# Patient Record
Sex: FEMALE | Race: WHITE | NOT HISPANIC OR LATINO | Employment: OTHER | URBAN - METROPOLITAN AREA
[De-identification: names, ages, dates, MRNs, and addresses within clinical notes are randomized per-mention and may not be internally consistent; named-entity substitution may affect disease eponyms.]

---

## 2022-05-09 RX ORDER — AMLODIPINE BESYLATE 10 MG/1
10 TABLET ORAL DAILY
COMMUNITY

## 2022-05-09 RX ORDER — VIT C/E/ZN/COPPR/LUTEIN/ZEAXAN 250MG-90MG
4000 CAPSULE ORAL DAILY
COMMUNITY

## 2022-05-09 RX ORDER — CALCITRIOL 0.25 UG/1
0.25 CAPSULE ORAL
COMMUNITY
Start: 2021-08-31 | End: 2023-09-15 | Stop reason: SDUPTHER

## 2022-05-10 RX ORDER — EZETIMIBE 10 MG/1
TABLET ORAL
COMMUNITY
End: 2022-12-02

## 2022-05-10 RX ORDER — OMEPRAZOLE 40 MG/1
40 CAPSULE, DELAYED RELEASE ORAL DAILY
COMMUNITY

## 2022-05-10 RX ORDER — GLIPIZIDE 10 MG/1
10 TABLET ORAL 2 TIMES DAILY
COMMUNITY

## 2022-05-10 RX ORDER — INSULIN DEGLUDEC 200 U/ML
14 INJECTION, SOLUTION SUBCUTANEOUS DAILY
COMMUNITY

## 2022-05-12 ENCOUNTER — OFFICE VISIT (OUTPATIENT)
Dept: NEPHROLOGY | Facility: CLINIC | Age: 77
End: 2022-05-12
Payer: MEDICARE

## 2022-05-12 VITALS
BODY MASS INDEX: 26.52 KG/M2 | OXYGEN SATURATION: 99 % | TEMPERATURE: 98 F | WEIGHT: 165 LBS | HEART RATE: 78 BPM | SYSTOLIC BLOOD PRESSURE: 139 MMHG | DIASTOLIC BLOOD PRESSURE: 82 MMHG | HEIGHT: 66 IN

## 2022-05-12 DIAGNOSIS — I10 ESSENTIAL HYPERTENSION: Primary | ICD-10-CM

## 2022-05-12 DIAGNOSIS — N18.4 CHRONIC KIDNEY DISEASE, STAGE IV (SEVERE): ICD-10-CM

## 2022-05-12 DIAGNOSIS — Z79.4 DIABETES MELLITUS, TYPE II, INSULIN DEPENDENT: ICD-10-CM

## 2022-05-12 DIAGNOSIS — N25.81 SECONDARY HYPERPARATHYROIDISM OF RENAL ORIGIN: ICD-10-CM

## 2022-05-12 DIAGNOSIS — E78.2 HYPERLIPIDEMIA, MIXED: ICD-10-CM

## 2022-05-12 DIAGNOSIS — E11.9 DIABETES MELLITUS, TYPE II, INSULIN DEPENDENT: ICD-10-CM

## 2022-05-12 DIAGNOSIS — R80.1 PERSISTENT PROTEINURIA: ICD-10-CM

## 2022-05-12 PROCEDURE — 99213 PR OFFICE/OUTPT VISIT, EST, LEVL III, 20-29 MIN: ICD-10-PCS | Mod: ,,, | Performed by: NURSE PRACTITIONER

## 2022-05-12 PROCEDURE — 99213 OFFICE O/P EST LOW 20 MIN: CPT | Mod: ,,, | Performed by: NURSE PRACTITIONER

## 2022-05-12 RX ORDER — ASPIRIN 81 MG/1
81 TABLET ORAL DAILY
COMMUNITY
End: 2023-09-15

## 2022-05-12 RX ORDER — ATORVASTATIN CALCIUM 80 MG/1
80 TABLET, FILM COATED ORAL DAILY
COMMUNITY

## 2022-05-12 NOTE — PROGRESS NOTES
Pt Name:  Anna Berry  Pt :  1945  Pt MRN:  [id number]    Date: 2022    Reason for visit:   Anna Berry is a 77 y.o. c female presenting for CKD follow up with serum creatinine 1.69, eGFR 29 and Chronic Kidney Disease Stage 4.     Chief Complaint:   The patient denies any complaints today.    HPI:  The patient is being seen today for follow up CKD and the status of her kidney function. Since the last visit, patient reports no health changes.  The patient denies fatigue, weakness, poor appetite, metallic taste, nausea, cramping, chest pain, palpitations, SOB, orthopnea, PND, edema, trouble concentrating, decreased urination.      Home BPs when checked are <130/80 per patient. The patient is following a low sodium diet. The patient is avoiding NSAIDs. The patient is drinking 64 + oz of water daily.     Since last visit on 22 patient reports no changes in medical history.      History:   Past Medical History:   Diagnosis Date    CKD (chronic kidney disease) stage 4, GFR 15-29 ml/min     DM type 2 (diabetes mellitus, type 2)     GERD (gastroesophageal reflux disease)     HTN (hypertension)     Mixed hyperlipidemia     Pancreatitis     Unspecified osteoarthritis, unspecified site      Past Surgical History:   Procedure Laterality Date    CHOLECYSTECTOMY      COLONOSCOPY      FRACTURE SURGERY      pins placed for facial fracture    HYSTERECTOMY      THROAT SURGERY       Family History   Problem Relation Age of Onset    Diabetes Mother     Diabetes Father     Asthma Sister     Hyperlipidemia Sister     Cancer Sister     Hypertension Sister     Diabetes Sister     Hypertension Brother     Hyperlipidemia Brother     Diabetes Brother     Cancer Brother     Heart disease Brother      Social History     Substance and Sexual Activity   Alcohol Use Never     Social History     Substance and Sexual Activity   Drug Use Never     Social History     Substance and Sexual Activity  "  Sexual Activity Not Currently     reports previously being sexually active.  Social History     Tobacco Use   Smoking Status Never Smoker   Smokeless Tobacco Never Used       Allergies:  Review of patient's allergies indicates:   Allergen Reactions    Penicillin g Anaphylaxis and Hives    Sulfa (sulfonamide antibiotics) Other (See Comments)     PATIENT STATE SHE FEELS "OUT OF HER HEAD"         Current Outpatient Medications:     amLODIPine (NORVASC) 10 MG tablet, Take 10 mg by mouth once daily at 6am., Disp: , Rfl:     aspirin (ECOTRIN) 81 MG EC tablet, Take 81 mg by mouth once daily., Disp: , Rfl:     atorvastatin (LIPITOR) 80 MG tablet, Take 80 mg by mouth once daily., Disp: , Rfl:     calcitRIOL (ROCALTROL) 0.25 MCG Cap, Take 0.25 mcg by mouth 3 (three) times daily., Disp: , Rfl:     cholecalciferol, vitamin D3, (VITAMIN D3) 50 mcg (2,000 unit) Cap capsule, Take 2,000 Units by mouth once daily at 6am., Disp: , Rfl:     ezetimibe (ZETIA) 10 mg tablet, ezetimibe 10 mg tablet  TAKE 1 TABLET BY MOUTH EVERY DAY, Disp: , Rfl:     glipiZIDE (GLUCOTROL) 10 MG tablet, Take 10 mg by mouth 2 (two) times a day., Disp: , Rfl:     insulin degludec (TRESIBA FLEXTOUCH U-200) 200 unit/mL (3 mL) insulin pen, Inject 12 Units into the skin once daily at 6am., Disp: , Rfl:     loratadine-pseudoephedrine 5-120 mg (CLARITIN-D 12-HOUR) 5-120 mg per tablet, Take 5 tablets by mouth once daily at 6am., Disp: , Rfl:     omeprazole (PRILOSEC) 40 MG capsule, Take 40 mg by mouth once daily at 6am., Disp: , Rfl:     ROS:  Review of Systems   Constitutional: Negative for activity change and appetite change.   HENT: Negative for hearing loss.    Eyes: Negative for visual disturbance.   Respiratory: Negative for shortness of breath and wheezing.    Cardiovascular: Negative for chest pain.   Gastrointestinal: Negative for abdominal pain, diarrhea, nausea and vomiting.   Genitourinary: Negative for decreased urine volume, dysuria, " "flank pain, frequency and urgency.   Neurological: Negative for dizziness, weakness and headaches.       Physical Exam:   Vitals:   Vitals:    05/12/22 1349   BP: 139/82   Pulse: 78   Temp: 98.4 °F (36.9 °C)   SpO2: 99%   Weight: 74.8 kg (165 lb)   Height: 5' 5.5" (1.664 m)   PainSc: 0-No pain     Body mass index is 27.04 kg/m².    Physical Exam  Vitals reviewed.   Constitutional:       General: She is not in acute distress.     Appearance: Normal appearance.   HENT:      Head: Normocephalic and atraumatic.      Mouth/Throat:      Mouth: Mucous membranes are moist.   Eyes:      Extraocular Movements: Extraocular movements intact.      Pupils: Pupils are equal, round, and reactive to light.   Cardiovascular:      Rate and Rhythm: Normal rate and regular rhythm.      Heart sounds: No murmur heard.  Pulmonary:      Effort: Pulmonary effort is normal.      Breath sounds: No wheezing, rhonchi or rales.   Musculoskeletal:         General: No swelling.   Skin:     General: Skin is warm and dry.   Neurological:      Mental Status: She is alert and oriented to person, place, and time.   Psychiatric:         Mood and Affect: Mood normal.         Behavior: Behavior normal.         Labs/Tests:  Component Ref Range & Units 3 d ago   (5/9/22) 4 mo ago   (1/12/22) 8 mo ago   (8/23/21) 1 yr ago   (4/20/21) 1 yr ago   (12/18/20) 1 yr ago   (8/14/20) 2 yr ago   (2/13/20)   Protein, Urine mg/dL mg/dL 34  57  44  35  23  30  22    Creatinine, Urine mg/dL mg/dL 67.6  112.5  119.2  97.0  111.4  94.3  135.6    Urine Protein/Creatinine Ratio <0.2 mg of protein/mg of creatinine 0.5 High   0.5 High   0.4 High           Component Ref Range & Units 3 d ago   (5/9/22) 3 mo ago   (1/21/22) 4 mo ago   (1/12/22) 8 mo ago   (8/23/21) 8 mo ago   (8/23/21) 1 yr ago   (4/20/21) 1 yr ago   (4/20/21)   Sodium 136 - 147 mmol/L 138  137  137  137    137    Potassium 3.5 - 5.1 mmol/L 4.8  4.5  5.6 High   4.6    5.0    Chloride 98 - 107 mmol/L 104  104  103 "  106    108 High     CO2 20 - 31 mmol/L 25  25  26  24    24    Glucose 70 - 99 mg/dL 146 High   148 High   149 High   132 High   Negative R  Negative R  182 High     BUN 9 - 23 mg/dL 25 High   27 High   19  26 High     29 High     Creatinine 0.55 - 1.02 mg/dL 1.69 High   1.68 High   1.59 High   1.71 High     1.89 High     Calcium 8.3 - 10.6 mg/dL 9.7  9.5  9.8  9.4    9.8    Phosphorus Level 2.4 - 5.1 mg/dL 3.6  3.6  3.3  3.6    4.0    Albumin Level 3.2 - 4.8 g/dL 4.3  4.5  4.5  4.6    4.4    eGFR >90 mL/min/1.73m2 29 Low   29 Low   31 Low   28 Low     25 Low     eGFR African American >90 mL/min/1.73m2 33 Low   33 Low  CM  36 Low           Component Ref Range & Units 3 d ago   (5/9/22) 4 mo ago   (1/12/22) 8 mo ago   (8/23/21) 1 yr ago   (4/20/21) 1 yr ago   (8/10/20) 2 yr ago   (2/13/20)   PTH, Intact 15 - 65 pg/mL 180 High   239 High   234 High   296              Diagnosis:  1. Essential hypertension  Renal Function Panel    Renal Function Panel   2. Diabetes mellitus, type II, insulin dependent  Renal Function Panel    Renal Function Panel   3. Secondary hyperparathyroidism of renal origin  PTH, Intact    PTH, Intact   4. Hyperlipidemia, mixed     5. Persistent proteinuria  Protein/Creatinine Ratio, Urine    Protein/Creatinine Ratio, Urine   6. Chronic kidney disease, stage IV (severe)  PTH, Intact    Renal Function Panel    Hemoglobin    PTH, Intact    Renal Function Panel    Hemoglobin       Plan:  1. Essential hypertension - near goal-  Monitor BP, 2 Gram NA diet; Continue Amlodipine 10 mg po daily    2. Diabetes mellitus, type II, insulin dependent -  unknown HgbA1c - Continue Glipizide 10 mg po daily, Tresiba 12 units daily; managed by PCP    3. Secondary hyperparathyroidism of renal origin -  - Continue Rocaltrol 0.25 mcg po M-W-F; Continue Vitamin D 3 2000 units daily    4. Hyperlipidemia, mixed - Continue Atorvastatin 80 mg and Zetia 10 mg    5. Persistent proteinuria - 500 mg -    6. Chronic  kidney disease, stage IV (severe); serum creatinine 1.69 / eGFR 29; (last visit ) Avoid NSAIDS; Assure 64 oz of water daily; meds per # 1, 2, 4          My reconciliation of medication should not condone or support use of medications that have been previously prescribed for patients by other providers.  I am only documenting the current medications patients is taking and may change doses, add or discontinue medications based on information provided by the patient.     The patient has been provided with their current level of kidney function including eGFR and creatinine.    We discussed the potential for common complications of CKD including anemia, electrolyte abnormalities, abnormal fluid balance, mineral bone disease and malnutrition.    We discussed strategies to slow the progression of their kidney disease includin. Avoid nephrotoxic agents. Avoid over-the-counter and prescription NSAIDs (Ibuprofren, Motrin, Naproxyn, Aleve, Mobic, Celebrex, Toradol, Advil). All of these can worsen kidney function, elevate BP, cause fluid retention/swelling and elevate potassium. Avoid iodine contrast agents and gadolinium, which can worsen kidney function and/or cause kidney failure. Avoid phosphosoda bowel preps which can worsen kidney function.  2. Work to improve modifiable risk factors. Aim for good control of blood glucose without episodes of hypoglycemia. Notify the provider managing your diabetes if your blood glucose < 60. Aim for good blood pressure control without episodes of hypotension. Call the office if your systolic blood pressure is consistently < 110. Aim for good control of your cholesterol.  ? AIC goal <7.0  ? BP goal <130/80        Keeping these in goal range will help prevent progression of cardiovascular disease            and chronic kidney disease.    We discussed dietary modifications:  ? Low sodium diet: 2 gm/d or less  ? Limit/avoid high potassium foods  ? Avoid potassium containing salt  substitutes  ? Limit/avoid high phosphorus foods  ? Limit daily protein intake to 0.8-1 gm/kg of your ideal body weight.    We discussed lifestyle modifications:  ? Make sure you are drinking plenty of fluids--64 ounces (1/2 gallon) daily  ? Exercise at least 30 minutes 5 x per week (total 150 minutes per week), example brisk walking  ? Achieve and maintain a healthy weight (BMI 20-25)  ? Limit alcohol consumption to <2 drinks per day  ? Stop smoking  ? Make sure you stay current on important vaccines-- pneumonia vaccines (Pneumovax and Prevnar), flu vaccine, Hepatitis B (especially patients nearing renal replacement therapy and planning hemodialysis) and Covid-19 vaccine.     Recommendations:  1. Monitor your BP at home daily and record.  2. Bring readings to your next appt.  3. Call the office if your BP is persistently >130/80.  4. Seek urgent medical attention with signs and symptoms of uremia - extreme weakness, fatigue, confusion, anorexia, metallic taste in mouth, hiccoughs, cramping, itching, chest pain, swelling, or trouble sleeping.    Follow Up:   Follow up in about 3 months (around 8/12/2022).

## 2022-08-12 ENCOUNTER — OFFICE VISIT (OUTPATIENT)
Dept: NEPHROLOGY | Facility: CLINIC | Age: 77
End: 2022-08-12
Payer: MEDICARE

## 2022-08-12 VITALS
DIASTOLIC BLOOD PRESSURE: 72 MMHG | HEIGHT: 66 IN | WEIGHT: 166.19 LBS | OXYGEN SATURATION: 99 % | TEMPERATURE: 97 F | SYSTOLIC BLOOD PRESSURE: 136 MMHG | HEART RATE: 86 BPM | BODY MASS INDEX: 26.71 KG/M2

## 2022-08-12 DIAGNOSIS — E11.9 DIABETES MELLITUS, TYPE II, INSULIN DEPENDENT: ICD-10-CM

## 2022-08-12 DIAGNOSIS — R80.9 NON-NEPHROTIC RANGE PROTEINURIA: ICD-10-CM

## 2022-08-12 DIAGNOSIS — E78.2 HYPERLIPIDEMIA, MIXED: ICD-10-CM

## 2022-08-12 DIAGNOSIS — Z79.4 DIABETES MELLITUS, TYPE II, INSULIN DEPENDENT: ICD-10-CM

## 2022-08-12 DIAGNOSIS — N25.81 SECONDARY HYPERPARATHYROIDISM OF RENAL ORIGIN: ICD-10-CM

## 2022-08-12 DIAGNOSIS — N18.4 CHRONIC KIDNEY DISEASE, STAGE IV (SEVERE): ICD-10-CM

## 2022-08-12 DIAGNOSIS — I10 ESSENTIAL HYPERTENSION: Primary | ICD-10-CM

## 2022-08-12 PROBLEM — R80.1 PERSISTENT PROTEINURIA: Status: RESOLVED | Noted: 2022-05-12 | Resolved: 2022-08-12

## 2022-08-12 PROCEDURE — 99213 PR OFFICE/OUTPT VISIT, EST, LEVL III, 20-29 MIN: ICD-10-PCS | Mod: ,,, | Performed by: NURSE PRACTITIONER

## 2022-08-12 PROCEDURE — 99213 OFFICE O/P EST LOW 20 MIN: CPT | Mod: ,,, | Performed by: NURSE PRACTITIONER

## 2022-08-12 NOTE — PROGRESS NOTES
Pt Name:  Anna Berry  Pt :  1945  Pt MRN:  [id number]    Date: 2022    Reason for visit:   Anna Berry is a 77 y.o. c female presenting for CKD follow up with serum creatinine 1.95, eGFR 24 and Chronic Kidney Disease Stage IV.     Chief Complaint:   The patient denies any complaints today.    HPI:  The patient is being seen today for follow up CKD and the status of her kidney function. Since the last visit, patient reports no health changes.  The patient denies fatigue, weakness, poor appetite, metallic taste, nausea, cramping, chest pain, palpitations, SOB, orthopnea, PND, edema, trouble concentrating, decreased urination.      Home BPs when checked are <130/80 per patient. The patient is following a low sodium diet. The patient is avoiding NSAIDs. The patient is drinking 32 - 64 oz of water daily.     Since last visit on 22 patient reports no changes in medical history.      History:   Past Medical History:   Diagnosis Date    CKD (chronic kidney disease) stage 4, GFR 15-29 ml/min     DM type 2 (diabetes mellitus, type 2)     GERD (gastroesophageal reflux disease)     HTN (hypertension)     Mixed hyperlipidemia     Pancreatitis     Unspecified osteoarthritis, unspecified site      Past Surgical History:   Procedure Laterality Date    CHOLECYSTECTOMY      COLONOSCOPY      FRACTURE SURGERY      pins placed for facial fracture    HYSTERECTOMY      THROAT SURGERY       Family History   Problem Relation Age of Onset    Diabetes Mother     Diabetes Father     Asthma Sister     Hyperlipidemia Sister     Cancer Sister     Hypertension Sister     Diabetes Sister     Hypertension Brother     Hyperlipidemia Brother     Diabetes Brother     Cancer Brother     Heart disease Brother      Social History     Substance and Sexual Activity   Alcohol Use Never     Social History     Substance and Sexual Activity   Drug Use Never     Social History     Substance and Sexual  "Activity   Sexual Activity Not Currently     reports previously being sexually active.  Social History     Tobacco Use   Smoking Status Never Smoker   Smokeless Tobacco Never Used       Allergies:  Review of patient's allergies indicates:   Allergen Reactions    Penicillin g Anaphylaxis and Hives    Sulfa (sulfonamide antibiotics) Other (See Comments)     PATIENT STATE SHE FEELS "OUT OF HER HEAD"         Current Outpatient Medications:     amLODIPine (NORVASC) 10 MG tablet, Take 10 mg by mouth once daily at 6am., Disp: , Rfl:     aspirin (ECOTRIN) 81 MG EC tablet, Take 81 mg by mouth once daily., Disp: , Rfl:     atorvastatin (LIPITOR) 80 MG tablet, Take 80 mg by mouth once daily., Disp: , Rfl:     calcitRIOL (ROCALTROL) 0.25 MCG Cap, Take 0.25 mcg by mouth once daily., Disp: , Rfl:     cholecalciferol, vitamin D3, (VITAMIN D3) 50 mcg (2,000 unit) Cap capsule, Take 2,000 Units by mouth once daily at 6am., Disp: , Rfl:     ezetimibe (ZETIA) 10 mg tablet, ezetimibe 10 mg tablet  TAKE 1 TABLET BY MOUTH EVERY DAY, Disp: , Rfl:     glipiZIDE (GLUCOTROL) 10 MG tablet, Take 10 mg by mouth 2 (two) times a day., Disp: , Rfl:     insulin degludec (TRESIBA FLEXTOUCH U-200) 200 unit/mL (3 mL) insulin pen, Inject 12 Units into the skin once daily at 6am., Disp: , Rfl:     omeprazole (PRILOSEC) 40 MG capsule, Take 40 mg by mouth once daily at 6am., Disp: , Rfl:     ROS:  Review of Systems   Constitutional: Negative for activity change and appetite change.   HENT: Negative for hearing loss.    Eyes: Negative for visual disturbance.   Respiratory: Negative for shortness of breath and wheezing.    Cardiovascular: Negative for chest pain.   Gastrointestinal: Negative for abdominal pain, diarrhea, nausea and vomiting.   Genitourinary: Negative for decreased urine volume, dysuria, flank pain, frequency and urgency.   Neurological: Negative for dizziness, weakness and headaches.       Physical Exam:   Vitals:   Vitals:    " "08/12/22 1307   BP: 136/72   Pulse: 86   Temp: 97.4 °F (36.3 °C)   SpO2: 99%   Weight: 75.4 kg (166 lb 3.2 oz)   Height: 5' 6" (1.676 m)   PainSc: 0-No pain     Body mass index is 26.83 kg/m².    Physical Exam  Vitals reviewed.   Constitutional:       General: She is not in acute distress.     Appearance: Normal appearance.   HENT:      Head: Normocephalic and atraumatic.      Mouth/Throat:      Mouth: Mucous membranes are moist.   Eyes:      Extraocular Movements: Extraocular movements intact.      Pupils: Pupils are equal, round, and reactive to light.   Cardiovascular:      Rate and Rhythm: Normal rate and regular rhythm.      Heart sounds: No murmur heard.     Comments: Trace pedal edema bilaterally   Pulmonary:      Effort: Pulmonary effort is normal.      Breath sounds: No wheezing, rhonchi or rales.   Musculoskeletal:         General: No swelling.   Skin:     General: Skin is warm and dry.   Neurological:      Mental Status: She is alert and oriented to person, place, and time.   Psychiatric:         Mood and Affect: Mood normal.         Behavior: Behavior normal.           Labs/Tests:  Lab Results   Component Value Date     (L) 08/08/2022    K 4.7 08/08/2022     08/08/2022    CO2 24 08/08/2022    BUN 32 (H) 08/08/2022    CREATININE 1.95 (H) 08/08/2022    CREATININE 1.69 (H) 05/09/2022    CREATININE 1.68 (H) 01/21/2022    GLUCOSE 318 (H) 08/08/2022    CALCIUM 9.7 08/08/2022    PHOSPHORUS 3.8 08/08/2022    ALBUMIN 4.4 08/08/2022    EGFRNONAA 24 (L) 08/08/2022    EGFRNONAA 29 (L) 05/09/2022    EGFRNONAA 29 (L) 01/21/2022    ESTGFRAFRICA 28 (L) 08/08/2022    ESTGFRAFRICA 33 (L) 05/09/2022    ESTGFRAFRICA 33 (L) 01/21/2022     (H) 08/08/2022    HGB 12.1 08/08/2022    QHCGGGIX23KE 12.6 (L) 04/20/2021     Component Ref Range & Units 4 d ago   (8/8/22) 3 mo ago   (5/9/22) 7 mo ago   (1/12/22) 11 mo ago   (8/23/21) 1 yr ago   (4/20/21) 1 yr ago   (12/18/20) 1 yr ago   (8/14/20)   Protein, Urine " mg/dL mg/dL 42  34  57  44  35  23  30    Creatinine, Urine mg/dL mg/dL 116.6  67.6  112.5  119.2  97.0  111.4  94.3    Urine Protein/Creatinine Ratio <0.2 mg of protein/mg of creatinine 0.4 High   0.5 High   0.5 High   0.4 High                Diagnosis:  1. Essential hypertension  Renal Function Panel    Renal Function Panel   2. Diabetes mellitus, type II, insulin dependent  Renal Function Panel    Renal Function Panel   3. Chronic kidney disease, stage IV (severe)  Hemoglobin    PTH, Intact    Renal Function Panel    Hemoglobin    PTH, Intact    Renal Function Panel   4. Secondary hyperparathyroidism of renal origin  PTH, Intact    PTH, Intact   5. Non-nephrotic range proteinuria  Protein/Creatinine Ratio, Urine    Protein/Creatinine Ratio, Urine   6. Hyperlipidemia, mixed  Renal Function Panel    Renal Function Panel         Plan:  Essential hypertension  At Goal  - Monitor BP, 2 Gram NA diet, Continue Norvasc 10 mg po daily     Diabetes mellitus, type II, insulin dependent  Control status unknown - Monitor BS, Continue Glipizide 10 mg po BID and Tresiba     Chronic kidney disease, stage IV (severe)  Serum creatinine 1.95 / eGFR 24; Avoid NSAIDS, Assure 64 oz of water daily, Meds per med list above    Secondary hyperparathyroidism of renal origin   - Continue Rocaltrol 25 mcg po daily    Non-nephrotic range proteinuria  Protein 400 mg - not on ACE/ARB - wants to wait on adding a medication since protein is stable     Hyperlipidemia, mixed  Continue Lipitor 80 mg po Q HS and Zetia 10 mg po Q HS         My reconciliation of medication should not condone or support use of medications that have been previously prescribed for patients by other providers.  I am only documenting the current medications patients is taking and may change doses, add or discontinue medications based on information provided by the patient.     The patient has been provided with their current level of kidney function including eGFR  and creatinine.    We discussed the potential for common complications of CKD including anemia, electrolyte abnormalities, abnormal fluid balance, mineral bone disease and malnutrition.    We discussed strategies to slow the progression of their kidney disease includin. Avoid nephrotoxic agents. Avoid over-the-counter and prescription NSAIDs (Ibuprofren, Motrin, Naproxyn, Aleve, Mobic, Celebrex, Toradol, Advil). All of these can worsen kidney function, elevate BP, cause fluid retention/swelling and elevate potassium. Avoid iodine contrast agents and gadolinium, which can worsen kidney function and/or cause kidney failure. Avoid phosphosoda bowel preps which can worsen kidney function.  2. Work to improve modifiable risk factors. Aim for good control of blood glucose without episodes of hypoglycemia. Notify the provider managing your diabetes if your blood glucose < 60. Aim for good blood pressure control without episodes of hypotension. Call the office if your systolic blood pressure is consistently < 110. Aim for good control of your cholesterol.  ? AIC goal <7.0  ? BP goal <130/80        Keeping these in goal range will help prevent progression of cardiovascular disease            and chronic kidney disease.    We discussed dietary modifications:  ? Low sodium diet: 2 gm/d or less  ? Limit/avoid high potassium foods  ? Avoid potassium containing salt substitutes  ? Limit/avoid high phosphorus foods  ? Limit daily protein intake to 0.8-1 gm/kg of your ideal body weight.    We discussed lifestyle modifications:  ? Make sure you are drinking plenty of fluids--64 ounces (1/2 gallon) daily  ? Exercise at least 30 minutes 5 x per week (total 150 minutes per week), example brisk walking  ? Achieve and maintain a healthy weight (BMI 20-25)  ? Limit alcohol consumption to <2 drinks per day  ? Stop smoking  ? Make sure you stay current on important vaccines-- pneumonia vaccines (Pneumovax and Prevnar), flu vaccine,  Hepatitis B (especially patients nearing renal replacement therapy and planning hemodialysis) and Covid-19 vaccine.     Recommendations:  1. Monitor your BP at home daily and record.  2. Bring readings to your next appt.  3. Call the office if your BP is persistently >130/80.  4. Seek urgent medical attention with signs and symptoms of uremia - extreme weakness, fatigue, confusion, anorexia, metallic taste in mouth, hiccoughs, cramping, itching, chest pain, swelling, or trouble sleeping.    Follow Up:   Follow up in about 3 months (around 11/12/2022).

## 2022-08-12 NOTE — ASSESSMENT & PLAN NOTE
Serum creatinine 1.95 / eGFR 24; Avoid NSAIDS, Assure 64 oz of water daily, Meds per med list above

## 2022-11-20 ENCOUNTER — HOSPITAL ENCOUNTER (OUTPATIENT)
Dept: TELEMEDICINE | Facility: HOSPITAL | Age: 77
Discharge: HOME | End: 2022-11-20
Payer: MEDICARE

## 2022-11-20 PROCEDURE — 99499 UNLISTED E&M SERVICE: CPT | Mod: ,,, | Performed by: PSYCHIATRY & NEUROLOGY

## 2022-11-20 PROCEDURE — 99499 NO LOS: ICD-10-PCS | Mod: ,,, | Performed by: PSYCHIATRY & NEUROLOGY

## 2022-11-20 NOTE — CONSULTS
[facility]  Vascular Neurology  Comprehensive Stroke Center  TeleVascular Neurology Acute Consultation Note      Consults    Consulting Provider: SHANNON HALL  Current Providers  No providers found    Patient Location: [facility] - TELE[facility] Emergency Department  [facility] hospital nurse at bedside with patient assisting consultant.     Patient information was obtained from patient.         Assessment/Plan:     Located at [facility]. She is presenting with generalized weakness and dizziness, LKW 1900. NIHSS-0. 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 Thrombolytic therapy not recommended due to outside of treatment window and mild non-disabling symptoms.      CT head per ER physician is no acute intra-cranial process. She does not appear large vessel occlusion but I recommend obtaining CTA head and neck with and without contrast or as CKD, recommend MRA head and neck without contrast.    Oral aspirin 81 mg now.  Head of bed flat, IV Fluids, permissive hypertension  CTA head and neck with and without contrast or as CKD, recommend MRA head and neck without contrast.  Neuro consult if in house available or transfer for neuro consult  Stroke/TIA work-up with MRI brain, Echo, PT/OT/ Speech and swallow evaluation.  They will call me with if MRA results are abnormal.        Diagnoses:   Stroke like symptoms    STROKE DOCUMENTATION     Acute Stroke Times:   Acute Stroke Times   Last Known Normal Date: 11/19/22  Last Known Normal Time: 1900  Stroke Team Called Date: 11/20/22  Stroke Team Called Time: 0243  Stroke Team Arrival Date: 11/20/22  Stroke Team Arrival Time: 0300  Thrombolytic Therapy Recommended: No    NIH Scale:  1a. Level of Consciousness: 0-->Alert, keenly responsive  1b. LOC Questions: 0-->Answers both questions correctly  1c. LOC Commands: 0-->Performs both tasks correctly  2. Best Gaze:  0-->Normal  3. Visual: 0-->No visual loss  4. Facial Palsy: 0-->Normal symmetrical movements  5a. Motor Arm, Left: 0-->No drift, limb holds 90 (or 45) degrees for full 10 secs  5b. Motor Arm, Right: 0-->No drift, limb holds 90 (or 45) degrees for full 10 secs  6a. Motor Leg, Left: 0-->No drift, leg holds 30 degree position for full 5 secs  6b. Motor Leg, Right: 0-->No drift, leg holds 30 degree position for full 5 secs  7. Limb Ataxia: 0-->Absent  8. Sensory: 0-->Normal, no sensory loss  9. Best Language: 0-->No aphasia, normal  10. Dysarthria: 0-->Normal  11. Extinction and Inattention (formerly Neglect): 0-->No abnormality  Total (NIH Stroke Scale): 0     Modified [place]    [place] Coma Scale:    ABCD2 Score:    [id number] Score:   HAS -BLED Score:   ICH Score:   Hunt & Mcneal Classification:       There were no vitals taken for this visit.  Eligible for thrombolytic therapy?: Yes  Thrombolytic therapy recomended: Thrombolytic therapy not recommended due to Outside of treatment window  and Mild Non-Disabling Symptoms  Possible Interventional Revascularization Candidate? Awaiting CTA results from [facility] for determination     Disposition Recommendation: pending further studies  admit to inpatient    Subjective:     History of Present Illness:  Located at [facility]. generalized weakness, dizziness, LKW 1900.      Woke up with symptoms?: no    Recent bleeding noted: no  Does the patient take any Blood Thinners? unknown  Medications: No relevant medications      Past Medical History: kidney problems/dialysis    Past Surgical History: no major surgeries within the last 2 weeks    Family History: no relevant history    Social History: unable to obtain    Allergies: Penicillin G  Sulfa (Sulfonamide Antibiotics) No relevant allergies    Review of Systems  Objective:   Vitals: There were no vitals taken for this visit. BP: na    CT READ: Yes  Abnormal CT old left frontal encephalomalacia.     Physical  Exam        Recommended the emergency room physician to have a brief discussion with the patient and/or family if available regarding the  risks and benefits of treatment, and to briefly document the occurrence of that discussion in his clinical encounter note.     The attending portion of this evaluation, treatment, and documentation was performed per Oniel Carvalho MD via audio only.    Billing code: No LOS (audio only consult)    In your opinion, this was a: Tier 1 Van Negative    Consult End Time: 8:46 AM     Oniel Carvalho MD  [facility]  Vascular Neurology   [facility]

## 2022-11-20 NOTE — SUBJECTIVE & OBJECTIVE
Woke up with symptoms?: no    Recent bleeding noted: no  Does the patient take any Blood Thinners? unknown  Medications: No relevant medications      Past Medical History: kidney problems/dialysis    Past Surgical History: no major surgeries within the last 2 weeks    Family History: no relevant history    Social History: unable to obtain    Allergies: Penicillin G  Sulfa (Sulfonamide Antibiotics) No relevant allergies    Review of Systems  Objective:   Vitals: There were no vitals taken for this visit. BP: na    CT READ: Yes  Abnormal CT old left frontal encephalomalacia.     Physical Exam

## 2022-11-20 NOTE — HPI
During the [facility] follow-up call the patient states that he is in need of a wheelchair. The patient request is sent to the home health PT, Nurse, and team clinical leader. The patient is encouraged to follow-up with the home health office if he receives no results.   Located at [facility]. generalized weakness, dizziness, LKW 1900.

## 2022-11-29 PROBLEM — N18.4 HYPERTENSION ASSOCIATED WITH STAGE 4 CHRONIC KIDNEY DISEASE DUE TO TYPE 2 DIABETES MELLITUS: Status: ACTIVE | Noted: 2022-05-12

## 2022-11-29 PROBLEM — E11.22 TYPE 2 DIABETES MELLITUS WITH STAGE 4 CHRONIC KIDNEY DISEASE, WITHOUT LONG-TERM CURRENT USE OF INSULIN: Status: ACTIVE | Noted: 2022-05-12

## 2022-11-29 PROBLEM — I12.9 HYPERTENSION ASSOCIATED WITH STAGE 4 CHRONIC KIDNEY DISEASE DUE TO TYPE 2 DIABETES MELLITUS: Status: ACTIVE | Noted: 2022-05-12

## 2022-11-29 PROBLEM — E11.22 HYPERTENSION ASSOCIATED WITH STAGE 4 CHRONIC KIDNEY DISEASE DUE TO TYPE 2 DIABETES MELLITUS: Status: ACTIVE | Noted: 2022-05-12

## 2022-11-29 PROBLEM — N18.4 TYPE 2 DIABETES MELLITUS WITH STAGE 4 CHRONIC KIDNEY DISEASE, WITHOUT LONG-TERM CURRENT USE OF INSULIN: Status: ACTIVE | Noted: 2022-05-12

## 2022-11-29 NOTE — PROGRESS NOTES
Pt Name:  Anna Berry  Pt :  1945  Pt MRN:  [id number]    Date: 2022    Reason for visit:   Anna Berry is a 77 y.o. c female presenting for CKD follow up with serum creatinine 1.97, eGFR 24 and Chronic Kidney Disease Stage 4.     Chief Complaint:   The patient denies any complaints today.    HPI:  The patient is being seen today for follow up CKD and the status of her kidney function. Since the last visit, patient reports/medical record review reveals patient was hospitalized from  - 22 for CVA of Left Periventricular region, no deficits. She was started on Plavix daily. The patient denies fatigue, weakness, poor appetite, metallic taste, nausea, cramping, chest pain, palpitations, SOB, orthopnea, PND, trouble concentrating, decreased urination.  She c/o right lower extremity edema.     Home BPs when checked are <130/80 per patient. The patient is following a low sodium diet. The patient is avoiding NSAIDs. The patient is drinking 48 - 64 oz of water daily.     Since last visit on 22 patient reports above noted changes in medical history.      History:   Past Medical History:   Diagnosis Date    CKD (chronic kidney disease) stage 4, GFR 15-29 ml/min     DM type 2 (diabetes mellitus, type 2)     GERD (gastroesophageal reflux disease)     HTN (hypertension)     Mixed hyperlipidemia     Pancreatitis     Unspecified osteoarthritis, unspecified site      Past Surgical History:   Procedure Laterality Date    CHOLECYSTECTOMY      COLONOSCOPY      FRACTURE SURGERY      pins placed for facial fracture    HYSTERECTOMY      THROAT SURGERY       Family History   Problem Relation Age of Onset    Diabetes Mother     Diabetes Father     Asthma Sister     Hyperlipidemia Sister     Cancer Sister     Hypertension Sister     Diabetes Sister     Hypertension Brother     Hyperlipidemia Brother     Diabetes Brother     Cancer Brother     Heart disease Brother      Social History     Substance and  "Sexual Activity   Alcohol Use Never     Social History     Substance and Sexual Activity   Drug Use Never     Social History     Substance and Sexual Activity   Sexual Activity Not Currently     reports that she is not currently sexually active.  Social History     Tobacco Use   Smoking Status Never   Smokeless Tobacco Never       Allergies:  Review of patient's allergies indicates:   Allergen Reactions    Penicillin g Anaphylaxis and Hives    Sulfa (sulfonamide antibiotics) Other (See Comments)     PATIENT STATE SHE FEELS "OUT OF HER HEAD"         Current Outpatient Medications:     amLODIPine (NORVASC) 10 MG tablet, Take 10 mg by mouth once daily., Disp: , Rfl:     aspirin (ECOTRIN) 81 MG EC tablet, Take 81 mg by mouth once daily., Disp: , Rfl:     atorvastatin (LIPITOR) 80 MG tablet, Take 80 mg by mouth once daily., Disp: , Rfl:     calcitRIOL (ROCALTROL) 0.25 MCG Cap, Take 0.25 mcg by mouth once daily., Disp: , Rfl:     cholecalciferol, vitamin D3, (VITAMIN D3) 25 mcg (1,000 unit) capsule, Take 1,000 Units by mouth once daily at 6am., Disp: , Rfl:     clopidogreL (PLAVIX) 75 mg tablet, Take 75 mg by mouth once daily. Every morning, Disp: , Rfl:     glipiZIDE (GLUCOTROL) 10 MG tablet, Take 10 mg by mouth 2 (two) times a day., Disp: , Rfl:     insulin degludec (TRESIBA FLEXTOUCH U-200) 200 unit/mL (3 mL) insulin pen, Inject 14 Units into the skin once daily., Disp: , Rfl:     omeprazole (PRILOSEC) 40 MG capsule, Take 40 mg by mouth Daily., Disp: , Rfl:     ROS:  Review of Systems   Constitutional:  Negative for activity change and appetite change.   HENT:  Negative for hearing loss.    Eyes:  Negative for visual disturbance.   Respiratory:  Negative for shortness of breath and wheezing.    Cardiovascular:  Positive for leg swelling. Negative for chest pain.   Gastrointestinal:  Negative for abdominal pain, diarrhea, nausea and vomiting.   Genitourinary:  Negative for decreased urine volume, dysuria, flank pain, " "frequency and urgency.   Neurological:  Negative for dizziness, weakness and headaches.     Physical Exam:   Vitals:   Vitals:    22 1314   BP: (!) 142/76   Pulse: 76   SpO2: 98%   Weight: 76.7 kg (169 lb)   Height: 5' 5" (1.651 m)     Body mass index is 28.12 kg/m².    Physical Exam  Vitals reviewed.   Constitutional:       General: She is not in acute distress.     Appearance: Normal appearance.   HENT:      Head: Normocephalic and atraumatic.      Mouth/Throat:      Mouth: Mucous membranes are moist.   Eyes:      Extraocular Movements: Extraocular movements intact.      Pupils: Pupils are equal, round, and reactive to light.   Cardiovascular:      Rate and Rhythm: Normal rate and regular rhythm.      Heart sounds: No murmur heard.  Pulmonary:      Effort: Pulmonary effort is normal.      Breath sounds: No wheezing, rhonchi or rales.   Musculoskeletal:         General: No swelling.      Right lower le+ Pitting Edema present.      Left lower le+ Pitting Edema present.   Skin:     General: Skin is warm and dry.   Neurological:      Mental Status: She is alert and oriented to person, place, and time.   Psychiatric:         Mood and Affect: Mood normal.         Behavior: Behavior normal.       Labs/Tests:  Lab Results   Component Value Date     2022    K 5.1 2022     2022    CO2 27 2022    BUN 23 2022    CREATININE 1.97 (H) 2022    CREATININE 2.07 (H) 2022    CREATININE 1.95 (H) 2022    GLUCOSE 79 2022    CALCIUM 10.3 2022    PHOSPHORUS 3.5 2022    ALBUMIN 4.6 2022    EGFRNONAA 24 (L) 2022    EGFRNONAA 22 (L) 2022    EGFRNONAA 24 (L) 2022    ESTGFRAFRICA 27 (L) 2022    ESTGFRAFRICA 26 (L) 2022    ESTGFRAFRICA 28 (L) 2022     (H) 2022    HGB 12.0 2022    HGB 12.3 2022    HGB 11.7 2022    HGBA1C 8.8 (H) 2022    TRIG 152 (H) 2022    CHOL 162 " 11/21/2022    HDL 45 11/21/2022    LDLCALC 86 11/21/2022    LABVLDL 30 (H) 11/21/2022    IRREKTAS37ZW 12.6 (L) 04/20/2021       Lab Results   Component Value Date    UPROTCREA 0.2 (H) 11/28/2022    UPROTCREA 0.4 (H) 08/08/2022    UPROTCREA 0.5 (H) 05/09/2022         Diagnosis and Plan:   1. Type 2 diabetes mellitus with stage 4 chronic kidney disease, without long-term current use of insulin  Assessment & Plan:  Poorly controlled with HgbA1c 8.8% on 11/21/22 -  Monitor BS, Continue Glipizide 10 mg po BID and Tresiba     Orders:  -     Renal Function Panel; Future; Expected date: 03/02/2023  -     Protein/Creatinine Ratio, Urine; Future; Expected date: 03/02/2023    2. Hypertension associated with stage 4 chronic kidney disease due to type 2 diabetes mellitus  Assessment & Plan:  At Goal  - Monitor BP, 2 Gram NA diet, Continue Norvasc 10 mg po daily     Orders:  -     Renal Function Panel; Future; Expected date: 03/02/2023    3. Chronic kidney disease, stage IV (severe)  Assessment & Plan:  Stable - Serum creatinine 1.97 / eGFR 24 - without Anemia -  Avoid NSAIDS, Assure 64 oz of water daily, Meds per med list above    Orders:  -     Hemoglobin; Future; Expected date: 03/02/2023  -     Vitamin D; Future; Expected date: 03/02/2023  -     Renal Function Panel; Future; Expected date: 03/02/2023  -     Protein/Creatinine Ratio, Urine; Future; Expected date: 03/02/2023  -     PTH, Intact; Future; Expected date: 03/02/2023    4. Secondary hyperparathyroidism of renal origin  Assessment & Plan:   up from 116 - Decrease Rocaltrol 25 mcg po to Mon-Wed-Fri only  (Calcium 10.3)     Orders:  -     PTH, Intact; Future; Expected date: 03/02/2023    5. Non-nephrotic range proteinuria  Assessment & Plan:  Protein down to 200 mg from 400 mg - not on ACE/ARB - wants to wait on adding a medication since protein is stable     Orders:  -     Protein/Creatinine Ratio, Urine; Future; Expected date: 03/02/2023    6. Hyperlipidemia,  mixed  Assessment & Plan:  Continue Lipitor 80 mg po Q HS            My reconciliation of medication should not condone or support use of medications that have been previously prescribed for patients by other providers.  I am only documenting the current medications patients is taking and may change doses, add or discontinue medications based on information provided by the patient.     The patient has been provided with their current level of kidney function including eGFR and creatinine.    We discussed the potential for common complications of CKD including anemia, electrolyte abnormalities, abnormal fluid balance, mineral bone disease and malnutrition.    We discussed strategies to slow the progression of their kidney disease including:  Avoid nephrotoxic agents. Avoid over-the-counter and prescription NSAIDs (Ibuprofren, Motrin, Naproxyn, Aleve, Mobic, Celebrex, Toradol, Advil). All of these can worsen kidney function, elevate BP, cause fluid retention/swelling and elevate potassium. Avoid iodine contrast agents and gadolinium, which can worsen kidney function and/or cause kidney failure. Avoid phosphosoda bowel preps which can worsen kidney function.  Work to improve modifiable risk factors. Aim for good control of blood glucose without episodes of hypoglycemia. Notify the provider managing your diabetes if your blood glucose < 60. Aim for good blood pressure control without episodes of hypotension. Call the office if your systolic blood pressure is consistently < 110. Aim for good control of your cholesterol.  AIC goal <7.0  BP goal <130/80        Keeping these in goal range will help prevent progression of cardiovascular disease            and chronic kidney disease.    We discussed dietary modifications:  Low sodium diet: 2 gm/d or less  Limit/avoid high potassium foods  Avoid potassium containing salt substitutes  Limit/avoid high phosphorus foods  Limit daily protein intake to 0.8-1 gm/kg of your ideal  body weight.    We discussed lifestyle modifications:  Make sure you are drinking plenty of fluids--64 ounces (1/2 gallon) daily  Exercise at least 30 minutes 5 x per week (total 150 minutes per week), example brisk walking  Achieve and maintain a healthy weight (BMI 20-25)  Limit alcohol consumption to <2 drinks per day  Stop smoking  Make sure you stay current on important vaccines-- pneumonia vaccines (Pneumovax and Prevnar), flu vaccine, Hepatitis B (especially patients nearing renal replacement therapy and planning hemodialysis) and Covid-19 vaccine.     Recommendations:  Monitor your BP at home daily and record.  Bring readings to your next appt.  Call the office if your BP is persistently >130/80.  Seek urgent medical attention with signs and symptoms of uremia - extreme weakness, fatigue, confusion, anorexia, metallic taste in mouth, hiccoughs, cramping, itching, chest pain, swelling, or trouble sleeping.    Follow Up:   Follow up in about 3 months (around 3/2/2023).

## 2022-11-29 NOTE — ASSESSMENT & PLAN NOTE
Poorly controlled with HgbA1c 8.8% on 11/21/22 -  Monitor BS, Continue Glipizide 10 mg po BID and Tresiba

## 2022-11-29 NOTE — ASSESSMENT & PLAN NOTE
Protein down to 200 mg from 400 mg - not on ACE/ARB - wants to wait on adding a medication since protein is stable

## 2022-11-29 NOTE — ASSESSMENT & PLAN NOTE
Stable - Serum creatinine 1.97 / eGFR 24 - without Anemia -  Avoid NSAIDS, Assure 64 oz of water daily, Meds per med list above

## 2022-12-02 ENCOUNTER — OFFICE VISIT (OUTPATIENT)
Dept: NEPHROLOGY | Facility: CLINIC | Age: 77
End: 2022-12-02
Payer: MEDICARE

## 2022-12-02 VITALS
DIASTOLIC BLOOD PRESSURE: 76 MMHG | SYSTOLIC BLOOD PRESSURE: 142 MMHG | OXYGEN SATURATION: 98 % | HEART RATE: 76 BPM | BODY MASS INDEX: 28.16 KG/M2 | WEIGHT: 169 LBS | HEIGHT: 65 IN

## 2022-12-02 DIAGNOSIS — N18.4 HYPERTENSION ASSOCIATED WITH STAGE 4 CHRONIC KIDNEY DISEASE DUE TO TYPE 2 DIABETES MELLITUS: ICD-10-CM

## 2022-12-02 DIAGNOSIS — E11.22 HYPERTENSION ASSOCIATED WITH STAGE 4 CHRONIC KIDNEY DISEASE DUE TO TYPE 2 DIABETES MELLITUS: ICD-10-CM

## 2022-12-02 DIAGNOSIS — E78.2 HYPERLIPIDEMIA, MIXED: ICD-10-CM

## 2022-12-02 DIAGNOSIS — R80.9 NON-NEPHROTIC RANGE PROTEINURIA: ICD-10-CM

## 2022-12-02 DIAGNOSIS — N18.4 CHRONIC KIDNEY DISEASE, STAGE IV (SEVERE): ICD-10-CM

## 2022-12-02 DIAGNOSIS — I12.9 HYPERTENSION ASSOCIATED WITH STAGE 4 CHRONIC KIDNEY DISEASE DUE TO TYPE 2 DIABETES MELLITUS: ICD-10-CM

## 2022-12-02 DIAGNOSIS — E11.22 TYPE 2 DIABETES MELLITUS WITH STAGE 4 CHRONIC KIDNEY DISEASE, WITHOUT LONG-TERM CURRENT USE OF INSULIN: Primary | ICD-10-CM

## 2022-12-02 DIAGNOSIS — N18.4 TYPE 2 DIABETES MELLITUS WITH STAGE 4 CHRONIC KIDNEY DISEASE, WITHOUT LONG-TERM CURRENT USE OF INSULIN: Primary | ICD-10-CM

## 2022-12-02 DIAGNOSIS — N25.81 SECONDARY HYPERPARATHYROIDISM OF RENAL ORIGIN: ICD-10-CM

## 2022-12-02 PROCEDURE — 99214 PR OFFICE/OUTPT VISIT, EST, LEVL IV, 30-39 MIN: ICD-10-PCS | Mod: ,,, | Performed by: NURSE PRACTITIONER

## 2022-12-02 PROCEDURE — 99214 OFFICE O/P EST MOD 30 MIN: CPT | Mod: ,,, | Performed by: NURSE PRACTITIONER

## 2022-12-02 RX ORDER — CLOPIDOGREL BISULFATE 75 MG/1
75 TABLET ORAL DAILY
COMMUNITY
Start: 2022-11-21

## 2023-03-02 PROBLEM — E55.9 VITAMIN D DEFICIENCY, UNSPECIFIED: Status: ACTIVE | Noted: 2023-03-02

## 2023-03-02 NOTE — ASSESSMENT & PLAN NOTE
Serum creatinine 1.89 / eGFR 27 (1.97 / eGFR 24 on 11/28/22)  - without Anemia -  Avoid NSAIDS, Assure 64 oz of water daily, Meds per med list above

## 2023-03-02 NOTE — PROGRESS NOTES
Pt Name:  Anna Berry  Pt :  1945  Pt MRN:  [id number]    Date: 3/3/2023    Reason for visit:   Anna Berry is a 77 y.o. c female presenting for CKD follow up with serum creatinine 1.89, eGFR 27 and Chronic Kidney Disease Stage IV.     Chief Complaint:   The patient denies any complaints today.    HPI:  The patient is being seen today for follow up CKD and the status of her kidney function. Since the last visit, patient reports no health changes.  She is scheduled for eye surgery on Monday.   The patient denies fatigue, weakness, poor appetite, metallic taste, nausea, cramping, chest pain, palpitations, SOB, orthopnea, PND, edema, trouble concentrating, decreased urination.      Home BPs when checked are <130-140/80 per patient. The patient is following a low sodium diet. The patient is avoiding NSAIDs. The patient is drinking 64 + oz of water daily.     Since last visit on 22 patient reports no changes in medical history.      History:   Past Medical History:   Diagnosis Date    CKD (chronic kidney disease) stage 4, GFR 15-29 ml/min     DM type 2 (diabetes mellitus, type 2)     GERD (gastroesophageal reflux disease)     HTN (hypertension)     Mixed hyperlipidemia     Pancreatitis     Unspecified osteoarthritis, unspecified site      Past Surgical History:   Procedure Laterality Date    CHOLECYSTECTOMY      COLONOSCOPY      FRACTURE SURGERY      pins placed for facial fracture    HYSTERECTOMY      THROAT SURGERY       Family History   Problem Relation Age of Onset    Diabetes Mother     Diabetes Father     Asthma Sister     Hyperlipidemia Sister     Cancer Sister     Hypertension Sister     Diabetes Sister     Hypertension Brother     Hyperlipidemia Brother     Diabetes Brother     Cancer Brother     Heart disease Brother      Social History     Substance and Sexual Activity   Alcohol Use Never     Social History     Substance and Sexual Activity   Drug Use Never     Social History  "    Substance and Sexual Activity   Sexual Activity Not Currently     reports that she is not currently sexually active.  Social History     Tobacco Use   Smoking Status Never   Smokeless Tobacco Never       Allergies:  Review of patient's allergies indicates:   Allergen Reactions    Penicillin g Anaphylaxis and Hives    Sulfa (sulfonamide antibiotics) Other (See Comments)     PATIENT STATE SHE FEELS "OUT OF HER HEAD"         Current Outpatient Medications:     amLODIPine (NORVASC) 10 MG tablet, Take 10 mg by mouth once daily., Disp: , Rfl:     aspirin (ECOTRIN) 81 MG EC tablet, Take 81 mg by mouth once daily., Disp: , Rfl:     atorvastatin (LIPITOR) 80 MG tablet, Take 80 mg by mouth once daily., Disp: , Rfl:     calcitRIOL (ROCALTROL) 0.25 MCG Cap, Take 0.25 mcg by mouth. Mon, wed, fri, Disp: , Rfl:     cholecalciferol, vitamin D3, (VITAMIN D3) 25 mcg (1,000 unit) capsule, Take 1,000 Units by mouth 2 (two) times a day., Disp: , Rfl:     clopidogreL (PLAVIX) 75 mg tablet, Take 75 mg by mouth once daily. Every morning, Disp: , Rfl:     glipiZIDE (GLUCOTROL) 10 MG tablet, Take 10 mg by mouth 2 (two) times a day., Disp: , Rfl:     insulin degludec (TRESIBA FLEXTOUCH U-200) 200 unit/mL (3 mL) insulin pen, Inject 14 Units into the skin once daily., Disp: , Rfl:     omeprazole (PRILOSEC) 40 MG capsule, Take 40 mg by mouth Daily., Disp: , Rfl:     ROS:  Review of Systems   Constitutional:  Negative for activity change and appetite change.   HENT:  Negative for hearing loss.    Eyes:  Negative for visual disturbance.   Respiratory:  Negative for shortness of breath and wheezing.    Cardiovascular:  Negative for chest pain.   Gastrointestinal:  Negative for abdominal pain, diarrhea, nausea and vomiting.   Genitourinary:  Negative for decreased urine volume, dysuria, flank pain, frequency and urgency.   Neurological:  Negative for dizziness, weakness and headaches.     Physical Exam:   Vitals:   Vitals:    03/03/23 1103   BP: " "139/66   SpO2: 96%   Weight: 75.3 kg (166 lb)   Height: 5' 5" (1.651 m)     Body mass index is 27.62 kg/m².    Physical Exam  Vitals reviewed.   Constitutional:       General: She is not in acute distress.     Appearance: Normal appearance.   HENT:      Head: Normocephalic and atraumatic.      Mouth/Throat:      Mouth: Mucous membranes are moist.   Eyes:      Extraocular Movements: Extraocular movements intact.      Pupils: Pupils are equal, round, and reactive to light.   Cardiovascular:      Rate and Rhythm: Normal rate and regular rhythm.      Heart sounds: Murmur heard.   Systolic murmur is present with a grade of 2/6.      Comments: Trace bilateral pedal edema   Pulmonary:      Effort: Pulmonary effort is normal.      Breath sounds: No wheezing, rhonchi or rales.   Musculoskeletal:         General: No swelling.      Right lower leg: Edema present.      Left lower leg: Edema present.   Skin:     General: Skin is warm and dry.   Neurological:      Mental Status: She is alert and oriented to person, place, and time.   Psychiatric:         Mood and Affect: Mood normal.         Behavior: Behavior normal.       Labs/Tests:    Contains abnormal data RENAL FUNCTION PANEL     Ref Range & Units 2 d ago   Sodium 136 - 147 mmol/L 137    Potassium 3.5 - 5.1 mmol/L 4.9    Chloride 98 - 107 mmol/L 104    CO2 20 - 31 mmol/L 23    Glucose 70 - 99 mg/dL 241 High     BUN 9 - 23 mg/dL 33 High     Creatinine 0.55 - 1.02 mg/dL 1.89 High     Calcium 8.3 - 10.6 mg/dL 9.3    Phosphorus Level 2.4 - 5.1 mg/dL 4.0    Albumin Level 3.2 - 4.8 g/dL 4.4    Anion Gap 5.0 - 15.0 mmol/L 9.6    eGFR CKD-EPI >90 ml/min/1.73m2 27 Low       Lab Results   Component Value Date     (H) 02/28/2023    HGB 12.0 02/28/2023    HGB 12.0 11/28/2022    HGB 12.3 11/21/2022    HGBA1C 8.8 (H) 11/21/2022    TRIG 152 (H) 11/21/2022    CHOL 162 11/21/2022    HDL 45 11/21/2022    LDLCALC 86 11/21/2022    LABVLDL 30 (H) 11/21/2022    [id number] 20.7 (L) " 02/28/2023       Lab Results   Component Value Date    UPROTCREA 0.3 (H) 02/28/2023    UPROTCREA 0.2 (H) 11/28/2022    UPROTCREA 0.4 (H) 08/08/2022         Diagnosis:  Plan and Assessment:  1. Type 2 diabetes mellitus with stage 4 chronic kidney disease, without long-term current use of insulin  Assessment & Plan:  Poorly controlled with HgbA1c 8.8% on 11/21/22 -  Monitor BS, Continue Glipizide 10 mg po BID and Tresiba     Orders:  -     Renal Function Panel; Future; Expected date: 06/03/2023  -     Protein/Creatinine Ratio, Urine; Future; Expected date: 06/03/2023    2. Hypertension associated with stage 4 chronic kidney disease due to type 2 diabetes mellitus  Assessment & Plan:  At Goal  - Monitor BP, 2 Gram NA diet, Continue Norvasc 10 mg po daily     Orders:  -     Renal Function Panel; Future; Expected date: 06/03/2023    3. Chronic kidney disease, stage IV (severe)  Assessment & Plan:  Serum creatinine 1.89 / eGFR 27 (1.97 / eGFR 24 on 11/28/22)  - without Anemia -  Avoid NSAIDS, Assure 64 oz of water daily, Meds per med list above    Orders:  -     Hemoglobin; Future; Expected date: 06/03/2023  -     Vitamin D; Future; Expected date: 06/03/2023  -     Renal Function Panel; Future; Expected date: 06/03/2023  -     PTH, Intact; Future; Expected date: 06/03/2023  -     Protein/Creatinine Ratio, Urine; Future; Expected date: 06/03/2023    4. Secondary hyperparathyroidism of renal origin  Assessment & Plan:   - Continue  Rocaltrol 25 mcg po Mon-Wed-Fri only     Orders:  -     Vitamin D; Future; Expected date: 06/03/2023  -     PTH, Intact; Future; Expected date: 06/03/2023    5. Non-nephrotic range proteinuria  Assessment & Plan:  300 mg - up from 200 mg - not on ACE/ARB - wants to wait on adding a medication since protein is stable     Orders:  -     Protein/Creatinine Ratio, Urine; Future; Expected date: 06/03/2023    6. Hyperlipidemia, mixed  Assessment & Plan:  Continue Lipitor 80 mg po Q HS       7.  Vitamin D deficiency, unspecified  Assessment & Plan:  Vitamin D 20.7 - Increase Vitamin D 3 1000 units po BID    Orders:  -     Vitamin D; Future; Expected date: 06/03/2023           My reconciliation of medication should not condone or support use of medications that have been previously prescribed for patients by other providers.  I am only documenting the current medications patients is taking and may change doses, add or discontinue medications based on information provided by the patient.     The patient has been provided with their current level of kidney function including eGFR and creatinine.    We discussed the potential for common complications of CKD including anemia, electrolyte abnormalities, abnormal fluid balance, mineral bone disease and malnutrition.    We discussed strategies to slow the progression of their kidney disease including:  Avoid nephrotoxic agents. Avoid over-the-counter and prescription NSAIDs (Ibuprofren, Motrin, Naproxyn, Aleve, Mobic, Celebrex, Toradol, Advil). All of these can worsen kidney function, elevate BP, cause fluid retention/swelling and elevate potassium. Avoid iodine contrast agents and gadolinium, which can worsen kidney function and/or cause kidney failure. Avoid phosphosoda bowel preps which can worsen kidney function.  Work to improve modifiable risk factors. Aim for good control of blood glucose without episodes of hypoglycemia. Notify the provider managing your diabetes if your blood glucose < 60. Aim for good blood pressure control without episodes of hypotension. Call the office if your systolic blood pressure is consistently < 110. Aim for good control of your cholesterol.  AIC goal <7.0  BP goal <130/80        Keeping these in goal range will help prevent progression of cardiovascular disease            and chronic kidney disease.    We discussed dietary modifications:  Low sodium diet: 2 gm/d or less  Limit/avoid high potassium foods  Avoid potassium  containing salt substitutes  Limit/avoid high phosphorus foods  Limit daily protein intake to 0.8-1 gm/kg of your ideal body weight.    We discussed lifestyle modifications:  Make sure you are drinking plenty of fluids--64 ounces (1/2 gallon) daily  Exercise at least 30 minutes 5 x per week (total 150 minutes per week), example brisk walking  Achieve and maintain a healthy weight (BMI 20-25)  Limit alcohol consumption to <2 drinks per day  Stop smoking  Make sure you stay current on important vaccines-- pneumonia vaccines (Pneumovax and Prevnar), flu vaccine, Hepatitis B (especially patients nearing renal replacement therapy and planning hemodialysis) and Covid-19 vaccine.     Recommendations:  Monitor your BP at home daily and record.  Bring readings to your next appt.  Call the office if your BP is persistently >130/80.  Seek urgent medical attention with signs and symptoms of uremia - extreme weakness, fatigue, confusion, anorexia, metallic taste in mouth, hiccoughs, cramping, itching, chest pain, swelling, or trouble sleeping.    Follow Up:   Follow up in about 3 months (around 6/3/2023).

## 2023-03-02 NOTE — ASSESSMENT & PLAN NOTE
300 mg - up from 200 mg - not on ACE/ARB - wants to wait on adding a medication since protein is stable

## 2023-03-03 ENCOUNTER — OFFICE VISIT (OUTPATIENT)
Dept: NEPHROLOGY | Facility: CLINIC | Age: 78
End: 2023-03-03
Payer: MEDICARE

## 2023-03-03 VITALS
SYSTOLIC BLOOD PRESSURE: 139 MMHG | BODY MASS INDEX: 27.66 KG/M2 | OXYGEN SATURATION: 96 % | WEIGHT: 166 LBS | DIASTOLIC BLOOD PRESSURE: 66 MMHG | HEIGHT: 65 IN

## 2023-03-03 DIAGNOSIS — N25.81 SECONDARY HYPERPARATHYROIDISM OF RENAL ORIGIN: ICD-10-CM

## 2023-03-03 DIAGNOSIS — R80.9 NON-NEPHROTIC RANGE PROTEINURIA: ICD-10-CM

## 2023-03-03 DIAGNOSIS — E55.9 VITAMIN D DEFICIENCY, UNSPECIFIED: ICD-10-CM

## 2023-03-03 DIAGNOSIS — E11.22 TYPE 2 DIABETES MELLITUS WITH STAGE 4 CHRONIC KIDNEY DISEASE, WITHOUT LONG-TERM CURRENT USE OF INSULIN: ICD-10-CM

## 2023-03-03 DIAGNOSIS — E11.22 HYPERTENSION ASSOCIATED WITH STAGE 4 CHRONIC KIDNEY DISEASE DUE TO TYPE 2 DIABETES MELLITUS: ICD-10-CM

## 2023-03-03 DIAGNOSIS — I12.9 HYPERTENSION ASSOCIATED WITH STAGE 4 CHRONIC KIDNEY DISEASE DUE TO TYPE 2 DIABETES MELLITUS: ICD-10-CM

## 2023-03-03 DIAGNOSIS — N18.4 CHRONIC KIDNEY DISEASE, STAGE IV (SEVERE): ICD-10-CM

## 2023-03-03 DIAGNOSIS — N18.4 TYPE 2 DIABETES MELLITUS WITH STAGE 4 CHRONIC KIDNEY DISEASE, WITHOUT LONG-TERM CURRENT USE OF INSULIN: ICD-10-CM

## 2023-03-03 DIAGNOSIS — N18.4 HYPERTENSION ASSOCIATED WITH STAGE 4 CHRONIC KIDNEY DISEASE DUE TO TYPE 2 DIABETES MELLITUS: ICD-10-CM

## 2023-03-03 DIAGNOSIS — E78.2 HYPERLIPIDEMIA, MIXED: ICD-10-CM

## 2023-03-03 PROCEDURE — 99214 OFFICE O/P EST MOD 30 MIN: CPT | Mod: ,,, | Performed by: NURSE PRACTITIONER

## 2023-03-03 PROCEDURE — 99214 PR OFFICE/OUTPT VISIT, EST, LEVL IV, 30-39 MIN: ICD-10-PCS | Mod: ,,, | Performed by: NURSE PRACTITIONER

## 2023-06-08 NOTE — PROGRESS NOTES
Pt Name:  Anna Berry  Pt :  1945  Pt MRN:  [id number]    Date: 2023    Reason for visit:   Anna Berry is a 78 y.o. c female presenting for CKD follow up with serum creatinine 1.81, eGFR 28 and Chronic Kidney Disease Stage IV.     Chief Complaint:   The patient denies any complaints today.    HPI:  The patient is being seen today for follow up CKD and the status of her kidney function. Since the last visit, patient reports she had right eye surgery on 3/6/23.  The patient denies fatigue, weakness, poor appetite, metallic taste, nausea, cramping, chest pain, palpitations, SOB, orthopnea, PND, edema, trouble concentrating, decreased urination.      Home BPs when checked are <130/80 per patient. The patient is following a low sodium diet. The patient is avoiding NSAIDs. The patient is drinking 64 oz of water daily.     Since last visit on 3/3/23 patient reports above noted changes in medical history.      History:   Past Medical History:   Diagnosis Date    CKD (chronic kidney disease) stage 4, GFR 15-29 ml/min     DM type 2 (diabetes mellitus, type 2)     GERD (gastroesophageal reflux disease)     HTN (hypertension)     Mixed hyperlipidemia     Pancreatitis     Unspecified osteoarthritis, unspecified site      Past Surgical History:   Procedure Laterality Date    CHOLECYSTECTOMY      COLONOSCOPY      EYE SURGERY      FRACTURE SURGERY      pins placed for facial fracture    HYSTERECTOMY      THROAT SURGERY       Family History   Problem Relation Age of Onset    Diabetes Mother     Diabetes Father     Asthma Sister     Hyperlipidemia Sister     Cancer Sister     Hypertension Sister     Diabetes Sister     Hypertension Brother     Hyperlipidemia Brother     Diabetes Brother     Cancer Brother     Heart disease Brother      Social History     Substance and Sexual Activity   Alcohol Use Never     Social History     Substance and Sexual Activity   Drug Use Never     Social History     Substance and  "Sexual Activity   Sexual Activity Not Currently     reports that she is not currently sexually active.  Social History     Tobacco Use   Smoking Status Never   Smokeless Tobacco Never       Allergies:  Review of patient's allergies indicates:   Allergen Reactions    Penicillin g Anaphylaxis and Hives    Sulfa (sulfonamide antibiotics) Other (See Comments)     PATIENT STATE SHE FEELS "OUT OF HER HEAD"         Current Outpatient Medications:     amLODIPine (NORVASC) 10 MG tablet, Take 10 mg by mouth once daily., Disp: , Rfl:     aspirin (ECOTRIN) 81 MG EC tablet, Take 81 mg by mouth once daily., Disp: , Rfl:     atorvastatin (LIPITOR) 80 MG tablet, Take 80 mg by mouth once daily., Disp: , Rfl:     calcitRIOL (ROCALTROL) 0.25 MCG Cap, Take 0.25 mcg by mouth. Mon, wed, fri, Disp: , Rfl:     cholecalciferol, vitamin D3, (VITAMIN D3) 25 mcg (1,000 unit) capsule, Take 1,000 Units by mouth 2 (two) times a day., Disp: , Rfl:     clopidogreL (PLAVIX) 75 mg tablet, Take 75 mg by mouth once daily. Every morning, Disp: , Rfl:     glipiZIDE (GLUCOTROL) 10 MG tablet, Take 10 mg by mouth 2 (two) times a day., Disp: , Rfl:     insulin degludec (TRESIBA FLEXTOUCH U-200) 200 unit/mL (3 mL) insulin pen, Inject 14 Units into the skin once daily., Disp: , Rfl:     omeprazole (PRILOSEC) 40 MG capsule, Take 40 mg by mouth Daily., Disp: , Rfl:     ROS:  Review of Systems   Constitutional:  Negative for activity change and appetite change.   HENT:  Negative for hearing loss.    Eyes:  Negative for visual disturbance.   Respiratory:  Negative for shortness of breath and wheezing.    Cardiovascular:  Negative for chest pain.   Gastrointestinal:  Negative for abdominal pain, diarrhea, nausea and vomiting.   Genitourinary:  Negative for decreased urine volume, dysuria, flank pain, frequency and urgency.   Neurological:  Negative for dizziness, weakness and headaches.     Physical Exam:   Vitals:   Vitals:    06/09/23 1346   BP: 138/69   Pulse: " "69   SpO2: 98%   Weight: 73.5 kg (162 lb)   Height: 5' 5" (1.651 m)     Body mass index is 26.96 kg/m².    Physical Exam  Vitals reviewed.   Constitutional:       General: She is not in acute distress.     Appearance: Normal appearance.   HENT:      Head: Normocephalic and atraumatic.      Mouth/Throat:      Mouth: Mucous membranes are moist.   Eyes:      Extraocular Movements: Extraocular movements intact.      Pupils: Pupils are equal, round, and reactive to light.   Cardiovascular:      Rate and Rhythm: Normal rate and regular rhythm.      Heart sounds: Murmur heard.   Systolic murmur is present with a grade of 3/6.   Pulmonary:      Effort: Pulmonary effort is normal.      Breath sounds: No wheezing, rhonchi or rales.   Musculoskeletal:         General: No swelling.   Skin:     General: Skin is warm and dry.   Neurological:      Mental Status: She is alert and oriented to person, place, and time.   Psychiatric:         Mood and Affect: Mood normal.         Behavior: Behavior normal.       Labs/Tests:    Contains abnormal data RENAL FUNCTION PANEL  6/6/23     Ref Range & Units 2 d ago   Sodium 136 - 147 mmol/L 138    Potassium 3.5 - 5.1 mmol/L 4.4    Chloride 98 - 107 mmol/L 105    CO2 20 - 31 mmol/L 23    Glucose 70 - 99 mg/dL 132 High     BUN 9 - 23 mg/dL 29 High     Creatinine 0.55 - 1.02 mg/dL 1.81 High     Calcium 8.3 - 10.6 mg/dL 9.4    Phosphorus Level 2.4 - 5.1 mg/dL 4.0    Albumin Level 3.2 - 4.8 g/dL 4.7    Anion Gap 5.0 - 15.0 mmol/L 10.3    eGFR CKD-EPI >90 ml/min/1.73m2 28 Low       Lab Results   Component Value Date     (H) 06/06/2023    HGB 12.4 06/06/2023    HGB 12.0 02/28/2023    HGB 12.0 11/28/2022    HGBA1C 8.8 (H) 11/21/2022    TRIG 152 (H) 11/21/2022    CHOL 162 11/21/2022    HDL 45 11/21/2022    LDLCALC 86 11/21/2022    LABVLDL 30 (H) 11/21/2022    [id number] 26.8 (L) 06/06/2023       Lab Results   Component Value Date    UPROTCREA 0.3 (H) 06/06/2023    UPROTCREA 0.3 (H) 02/28/2023 "    UPROTCREA 0.2 (H) 11/28/2022         Diagnosis:  Plan and Assessment:  1. Type 2 diabetes mellitus with stage 4 chronic kidney disease, without long-term current use of insulin  Assessment & Plan:  Poorly controlled with HgbA1c 8.8% on 11/21/22 -  Monitor BS, Continue Glipizide 10 mg po BID and Tresiba     Orders:  -     Renal Function Panel; Future; Expected date: 09/09/2023  -     Protein/Creatinine Ratio, Urine; Future; Expected date: 09/09/2023    2. Hypertension associated with stage 4 chronic kidney disease due to type 2 diabetes mellitus  Assessment & Plan:  At Goal  - Monitor BP, 2 Gram NA diet, Continue Norvasc 10 mg po daily     Orders:  -     Renal Function Panel; Future; Expected date: 09/09/2023    3. Chronic kidney disease, stage IV (severe)  Assessment & Plan:  Serum creatinine 1.81 / eGFR 1.89 / eGFR 27;  (1.89 / eGFR 27 on 2/28/23 and 1.97 / eGFR 24 on 11/28/22)  - without Anemia -  Avoid NSAIDS, Assure 64 oz of water daily, Meds per med list above    Orders:  -     Hemoglobin; Future; Expected date: 09/09/2023  -     Vitamin D; Future; Expected date: 09/09/2023  -     Renal Function Panel; Future; Expected date: 09/09/2023  -     PTH, Intact; Future; Expected date: 09/09/2023  -     Protein/Creatinine Ratio, Urine; Future; Expected date: 09/09/2023    4. Secondary hyperparathyroidism of renal origin  Assessment & Plan:   - Continue  Rocaltrol 25 mcg po Mon-Wed-Fri only     Orders:  -     PTH, Intact; Future; Expected date: 09/09/2023    5. Non-nephrotic range proteinuria  Assessment & Plan:  300 mg  - not on ACE/ARB - wants to wait on adding a medication since protein is stable     Orders:  -     Protein/Creatinine Ratio, Urine; Future; Expected date: 09/09/2023    6. Hyperlipidemia, mixed  Assessment & Plan:  Continue Lipitor 80 mg po Q HS       7. Vitamin D deficiency, unspecified  Assessment & Plan:  Vitamin D 26.8 - up from 20.7 - Continue Vitamin D 3 1000 units po BID    Orders:  -      Vitamin D; Future; Expected date: 09/09/2023           My reconciliation of medication should not condone or support use of medications that have been previously prescribed for patients by other providers.  I am only documenting the current medications patients is taking and may change doses, add or discontinue medications based on information provided by the patient.     The patient has been provided with their current level of kidney function including eGFR and creatinine.    We discussed the potential for common complications of CKD including anemia, electrolyte abnormalities, abnormal fluid balance, mineral bone disease and malnutrition.    We discussed strategies to slow the progression of their kidney disease including:  Avoid nephrotoxic agents. Avoid over-the-counter and prescription NSAIDs (Ibuprofren, Motrin, Naproxyn, Aleve, Mobic, Celebrex, Toradol, Advil). All of these can worsen kidney function, elevate BP, cause fluid retention/swelling and elevate potassium. Avoid iodine contrast agents and gadolinium, which can worsen kidney function and/or cause kidney failure. Avoid phosphosoda bowel preps which can worsen kidney function.  Work to improve modifiable risk factors. Aim for good control of blood glucose without episodes of hypoglycemia. Notify the provider managing your diabetes if your blood glucose < 60. Aim for good blood pressure control without episodes of hypotension. Call the office if your systolic blood pressure is consistently < 110. Aim for good control of your cholesterol.  AIC goal <7.0  BP goal <130/80        Keeping these in goal range will help prevent progression of cardiovascular disease            and chronic kidney disease.    We discussed dietary modifications:  Low sodium diet: 2 gm/d or less  Limit/avoid high potassium foods  Avoid potassium containing salt substitutes  Limit/avoid high phosphorus foods  Limit daily protein intake to 0.8-1 gm/kg of your ideal body  weight.    We discussed lifestyle modifications:  Make sure you are drinking plenty of fluids--64 ounces (1/2 gallon) daily  Exercise at least 30 minutes 5 x per week (total 150 minutes per week), example brisk walking  Achieve and maintain a healthy weight (BMI 20-25)  Limit alcohol consumption to <2 drinks per day  Stop smoking  Make sure you stay current on important vaccines-- pneumonia vaccines (Pneumovax and Prevnar), flu vaccine, Hepatitis B (especially patients nearing renal replacement therapy and planning hemodialysis) and Covid-19 vaccine.     Recommendations:  Monitor your BP at home daily and record.  Bring readings to your next appt.  Call the office if your BP is persistently >130/80.  Seek urgent medical attention with signs and symptoms of uremia - extreme weakness, fatigue, confusion, anorexia, metallic taste in mouth, hiccoughs, cramping, itching, chest pain, swelling, or trouble sleeping.    Follow Up:   Follow up in about 3 months (around 9/9/2023).

## 2023-06-08 NOTE — ASSESSMENT & PLAN NOTE
Serum creatinine 1.81 / eGFR 1.89 / eGFR 27;  (1.89 / eGFR 27 on 2/28/23 and 1.97 / eGFR 24 on 11/28/22)  - without Anemia -  Avoid NSAIDS, Assure 64 oz of water daily, Meds per med list above

## 2023-06-09 ENCOUNTER — OFFICE VISIT (OUTPATIENT)
Dept: NEPHROLOGY | Facility: CLINIC | Age: 78
End: 2023-06-09
Payer: MEDICARE

## 2023-06-09 VITALS
HEIGHT: 65 IN | BODY MASS INDEX: 26.99 KG/M2 | SYSTOLIC BLOOD PRESSURE: 138 MMHG | DIASTOLIC BLOOD PRESSURE: 69 MMHG | HEART RATE: 69 BPM | WEIGHT: 162 LBS | OXYGEN SATURATION: 98 %

## 2023-06-09 DIAGNOSIS — N18.4 HYPERTENSION ASSOCIATED WITH STAGE 4 CHRONIC KIDNEY DISEASE DUE TO TYPE 2 DIABETES MELLITUS: ICD-10-CM

## 2023-06-09 DIAGNOSIS — N18.4 CHRONIC KIDNEY DISEASE, STAGE IV (SEVERE): ICD-10-CM

## 2023-06-09 DIAGNOSIS — E55.9 VITAMIN D DEFICIENCY, UNSPECIFIED: ICD-10-CM

## 2023-06-09 DIAGNOSIS — R80.9 NON-NEPHROTIC RANGE PROTEINURIA: ICD-10-CM

## 2023-06-09 DIAGNOSIS — N18.4 TYPE 2 DIABETES MELLITUS WITH STAGE 4 CHRONIC KIDNEY DISEASE, WITHOUT LONG-TERM CURRENT USE OF INSULIN: ICD-10-CM

## 2023-06-09 DIAGNOSIS — E11.22 TYPE 2 DIABETES MELLITUS WITH STAGE 4 CHRONIC KIDNEY DISEASE, WITHOUT LONG-TERM CURRENT USE OF INSULIN: ICD-10-CM

## 2023-06-09 DIAGNOSIS — E78.2 HYPERLIPIDEMIA, MIXED: ICD-10-CM

## 2023-06-09 DIAGNOSIS — E11.22 HYPERTENSION ASSOCIATED WITH STAGE 4 CHRONIC KIDNEY DISEASE DUE TO TYPE 2 DIABETES MELLITUS: ICD-10-CM

## 2023-06-09 DIAGNOSIS — I12.9 HYPERTENSION ASSOCIATED WITH STAGE 4 CHRONIC KIDNEY DISEASE DUE TO TYPE 2 DIABETES MELLITUS: ICD-10-CM

## 2023-06-09 DIAGNOSIS — N25.81 SECONDARY HYPERPARATHYROIDISM OF RENAL ORIGIN: ICD-10-CM

## 2023-06-09 PROCEDURE — 99213 PR OFFICE/OUTPT VISIT, EST, LEVL III, 20-29 MIN: ICD-10-PCS | Mod: S$GLB,,, | Performed by: NURSE PRACTITIONER

## 2023-06-09 PROCEDURE — 99213 OFFICE O/P EST LOW 20 MIN: CPT | Mod: S$GLB,,, | Performed by: NURSE PRACTITIONER

## 2023-09-15 ENCOUNTER — OFFICE VISIT (OUTPATIENT)
Dept: NEPHROLOGY | Facility: CLINIC | Age: 78
End: 2023-09-15
Payer: MEDICARE

## 2023-09-15 VITALS
HEIGHT: 65 IN | SYSTOLIC BLOOD PRESSURE: 137 MMHG | WEIGHT: 158 LBS | DIASTOLIC BLOOD PRESSURE: 72 MMHG | HEART RATE: 73 BPM | OXYGEN SATURATION: 98 % | BODY MASS INDEX: 26.33 KG/M2

## 2023-09-15 DIAGNOSIS — E11.22 HYPERTENSION ASSOCIATED WITH STAGE 3B CHRONIC KIDNEY DISEASE DUE TO TYPE 2 DIABETES MELLITUS: ICD-10-CM

## 2023-09-15 DIAGNOSIS — N18.32 TYPE 2 DIABETES MELLITUS WITH STAGE 3B CHRONIC KIDNEY DISEASE, WITHOUT LONG-TERM CURRENT USE OF INSULIN: ICD-10-CM

## 2023-09-15 DIAGNOSIS — N25.81 SECONDARY HYPERPARATHYROIDISM OF RENAL ORIGIN: ICD-10-CM

## 2023-09-15 DIAGNOSIS — E87.1 HYPONATREMIA: ICD-10-CM

## 2023-09-15 DIAGNOSIS — E11.22 TYPE 2 DIABETES MELLITUS WITH STAGE 3B CHRONIC KIDNEY DISEASE, WITHOUT LONG-TERM CURRENT USE OF INSULIN: ICD-10-CM

## 2023-09-15 DIAGNOSIS — R80.9 NON-NEPHROTIC RANGE PROTEINURIA: ICD-10-CM

## 2023-09-15 DIAGNOSIS — N18.32 STAGE 3B CHRONIC KIDNEY DISEASE: ICD-10-CM

## 2023-09-15 DIAGNOSIS — E78.2 HYPERLIPIDEMIA, MIXED: ICD-10-CM

## 2023-09-15 DIAGNOSIS — E55.9 VITAMIN D DEFICIENCY, UNSPECIFIED: ICD-10-CM

## 2023-09-15 DIAGNOSIS — N18.32 HYPERTENSION ASSOCIATED WITH STAGE 3B CHRONIC KIDNEY DISEASE DUE TO TYPE 2 DIABETES MELLITUS: ICD-10-CM

## 2023-09-15 DIAGNOSIS — I12.9 HYPERTENSION ASSOCIATED WITH STAGE 3B CHRONIC KIDNEY DISEASE DUE TO TYPE 2 DIABETES MELLITUS: ICD-10-CM

## 2023-09-15 PROCEDURE — 99214 OFFICE O/P EST MOD 30 MIN: CPT | Mod: S$GLB,,, | Performed by: NURSE PRACTITIONER

## 2023-09-15 PROCEDURE — 99214 PR OFFICE/OUTPT VISIT, EST, LEVL IV, 30-39 MIN: ICD-10-PCS | Mod: S$GLB,,, | Performed by: NURSE PRACTITIONER

## 2023-09-15 RX ORDER — MOXIFLOXACIN 5 MG/ML
1 SOLUTION/ DROPS OPHTHALMIC 4 TIMES DAILY
COMMUNITY
Start: 2023-08-11

## 2023-09-15 RX ORDER — CALCITRIOL 0.25 UG/1
0.25 CAPSULE ORAL
Qty: 30 CAPSULE | Refills: 11 | Status: SHIPPED | OUTPATIENT
Start: 2023-09-15

## 2023-09-15 RX ORDER — PREDNISOLONE ACETATE 10 MG/ML
1 SUSPENSION/ DROPS OPHTHALMIC 4 TIMES DAILY
COMMUNITY
Start: 2023-09-05

## 2023-09-15 RX ORDER — GENTAMICIN SULFATE 3 MG/ML
1 SOLUTION/ DROPS OPHTHALMIC 4 TIMES DAILY
COMMUNITY
Start: 2023-05-16

## 2023-09-15 NOTE — ASSESSMENT & PLAN NOTE
Sodium 134 - (mild hyponatremia) assure she is not over indulging in her water intake.  Restrict to 64 - 72 oz daily.

## 2023-09-15 NOTE — ASSESSMENT & PLAN NOTE
Serum creatinine 1.71 / eGFR 30;  (1.81 / eGFR 28 on 6/6/23 and 1.89 / eGFR 27 on 2/28/23 and 1.97 / eGFR 24 on 11/28/22)  - without Anemia -  Avoid NSAIDS, Assure 64 oz of water daily, Meds per med list above    K 5.1 - She has been eating bananas and raisins.  Dietary counseling on foods to avoid that are high in potassium.

## 2023-09-15 NOTE — PROGRESS NOTES
Pt Name:  Anna Berry  Pt :  1945  Pt MRN:  [id number]    Date: 9/15/2023    Reason for visit:   Anna Berry is a 78 y.o. c female presenting for CKD follow up with serum creatinine 1.71, eGFR 30 and Chronic Kidney Disease Stage 3b.     Chief Complaint:   The patient denies any complaints today.    HPI:  The patient is being seen today for follow up CKD and the status of her kidney function. Since the last visit, patient reports no health changes.  The patient denies fatigue, weakness, poor appetite, metallic taste, nausea, cramping, chest pain, palpitations, SOB, orthopnea, PND, edema, trouble concentrating, decreased urination.      Home BPs when checked are <130/80 per patient. The patient is following a low sodium diet. The patient is avoiding NSAIDs. The patient is drinking 100 oz of water daily.     Since last visit on 23 patient reports no changes in medical history.      History:   Past Medical History:   Diagnosis Date    CKD (chronic kidney disease) stage 4, GFR 15-29 ml/min     DM type 2 (diabetes mellitus, type 2)     GERD (gastroesophageal reflux disease)     HTN (hypertension)     Mixed hyperlipidemia     Pancreatitis     Unspecified osteoarthritis, unspecified site      Past Surgical History:   Procedure Laterality Date    CHOLECYSTECTOMY      COLONOSCOPY      EXTRACTION OF CATARACT      EYE SURGERY      FRACTURE SURGERY      pins placed for facial fracture    HYSTERECTOMY      THROAT SURGERY       Family History   Problem Relation Age of Onset    Diabetes Mother     Diabetes Father     Asthma Sister     Hyperlipidemia Sister     Cancer Sister     Hypertension Sister     Diabetes Sister     Hypertension Brother     Hyperlipidemia Brother     Diabetes Brother     Cancer Brother     Heart disease Brother      Social History     Substance and Sexual Activity   Alcohol Use Never     Social History     Substance and Sexual Activity   Drug Use Never     Social History     Substance  "and Sexual Activity   Sexual Activity Not Currently     reports that she is not currently sexually active.  Social History     Tobacco Use   Smoking Status Never   Smokeless Tobacco Never       Allergies:  Review of patient's allergies indicates:   Allergen Reactions    Penicillin g Anaphylaxis and Hives    Sulfa (sulfonamide antibiotics) Other (See Comments)     PATIENT STATE SHE FEELS "OUT OF HER HEAD"         Current Outpatient Medications:     amLODIPine (NORVASC) 10 MG tablet, Take 10 mg by mouth once daily., Disp: , Rfl:     atorvastatin (LIPITOR) 80 MG tablet, Take 80 mg by mouth once daily., Disp: , Rfl:     cholecalciferol, vitamin D3, (VITAMIN D3) 25 mcg (1,000 unit) capsule, Take 4,000 Units by mouth once daily., Disp: , Rfl:     clopidogreL (PLAVIX) 75 mg tablet, Take 75 mg by mouth once daily. Every morning, Disp: , Rfl:     gentamicin (GARAMYCIN) 0.3 % ophthalmic solution, Place 1 drop into the right eye 4 (four) times daily., Disp: , Rfl:     glipiZIDE (GLUCOTROL) 10 MG tablet, Take 10 mg by mouth 2 (two) times a day., Disp: , Rfl:     insulin degludec (TRESIBA FLEXTOUCH U-200) 200 unit/mL (3 mL) insulin pen, Inject 14 Units into the skin once daily., Disp: , Rfl:     moxifloxacin (VIGAMOX) 0.5 % ophthalmic solution, Place 1 drop into the right eye 4 (four) times daily., Disp: , Rfl:     omeprazole (PRILOSEC) 40 MG capsule, Take 40 mg by mouth Daily., Disp: , Rfl:     prednisoLONE acetate (PRED FORTE) 1 % DrpS, Place 1 drop into the right eye 4 (four) times daily., Disp: , Rfl:     calcitRIOL (ROCALTROL) 0.25 MCG Cap, Take 1 capsule (0.25 mcg total) by mouth every Mon, Wed, Fri. Mon, wed, fri, Disp: 30 capsule, Rfl: 11    ROS:  Review of Systems   Constitutional:  Negative for activity change and appetite change.   HENT:  Negative for hearing loss.    Eyes:  Negative for visual disturbance.   Respiratory:  Negative for shortness of breath and wheezing.    Cardiovascular:  Negative for chest pain. " "  Gastrointestinal:  Negative for abdominal pain, diarrhea, nausea and vomiting.   Genitourinary:  Negative for decreased urine volume, dysuria, flank pain, frequency and urgency.   Neurological:  Negative for dizziness, weakness and headaches.       Physical Exam:   Vitals:   Vitals:    09/15/23 1028   BP: 137/72   Pulse: 73   SpO2: 98%   Weight: 71.7 kg (158 lb)   Height: 5' 5" (1.651 m)     Body mass index is 26.29 kg/m².    Physical Exam  Vitals reviewed.   Constitutional:       General: She is not in acute distress.     Appearance: Normal appearance. She is overweight.   HENT:      Head: Normocephalic and atraumatic.      Mouth/Throat:      Mouth: Mucous membranes are moist.   Eyes:      Extraocular Movements: Extraocular movements intact.      Pupils: Pupils are equal, round, and reactive to light.   Cardiovascular:      Rate and Rhythm: Normal rate and regular rhythm.      Heart sounds: Murmur heard.      Systolic murmur is present with a grade of 3/6.   Pulmonary:      Effort: Pulmonary effort is normal.      Breath sounds: No wheezing, rhonchi or rales.   Musculoskeletal:         General: No swelling.   Skin:     General: Skin is warm and dry.   Neurological:      Mental Status: She is alert and oriented to person, place, and time.   Psychiatric:         Mood and Affect: Mood normal.         Behavior: Behavior normal.         Labs/Tests 9/13/23:      K 5.1    C02 22  Glu 119  BUN 25  Creat 1.71  CA 9.6  Phos 3.5  Alb 4.5  eGFR 30    Hgb 12.3        Vit D 16.9    Urine Prot/Creat 300 mg     Lab Results   Component Value Date     (H) 06/06/2023    HGB 12.4 06/06/2023    HGB 12.0 02/28/2023    HGB 12.0 11/28/2022    HGBA1C 8.8 (H) 11/21/2022    TRIG 152 (H) 11/21/2022    CHOL 162 11/21/2022    HDL 45 11/21/2022    LDLCALC 86 11/21/2022    LABVLDL 30 (H) 11/21/2022    [id number] 26.8 (L) 06/06/2023         Lab Results   Component Value Date    UPROTCREA 0.3 (H) 06/06/2023    " UPROTCREA 0.3 (H) 02/28/2023    UPROTCREA 0.2 (H) 11/28/2022         Diagnosis:  Plan and Assessment:  1. Type 2 diabetes mellitus with stage 3b chronic kidney disease, without long-term current use of insulin  Assessment & Plan:  Poorly controlled with HgbA1c 8.8% on 11/21/22 -  Monitor BS, Continue Glipizide 10 mg po BID and Tresiba     Orders:  -     Renal Function Panel; Future; Expected date: 01/12/2024  -     Protein/Creatinine Ratio, Urine; Future; Expected date: 01/12/2024    2. Hypertension associated with stage 3b chronic kidney disease due to type 2 diabetes mellitus  Assessment & Plan:  At Goal  - Monitor BP, 2 Gram NA diet, Continue Norvasc 10 mg po daily     Orders:  -     Renal Function Panel; Future; Expected date: 01/12/2024    3. Stage 3b chronic kidney disease  Assessment & Plan:  Serum creatinine 1.71 / eGFR 30;  (1.81 / eGFR 28 on 6/6/23 and 1.89 / eGFR 27 on 2/28/23 and 1.97 / eGFR 24 on 11/28/22)  - without Anemia -  Avoid NSAIDS, Assure 64 oz of water daily, Meds per med list above    K 5.1 - She has been eating bananas and raisins.  Dietary counseling on foods to avoid that are high in potassium.     Orders:  -     Hemoglobin; Future; Expected date: 01/12/2024  -     Vitamin D; Future; Expected date: 01/12/2024  -     Renal Function Panel; Future; Expected date: 01/12/2024  -     PTH, Intact; Future; Expected date: 01/12/2024  -     Protein/Creatinine Ratio, Urine; Future; Expected date: 01/12/2024    4. Secondary hyperparathyroidism of renal origin  Assessment & Plan:   - up from 181 - Continue  Rocaltrol 25 mcg po Mon-Wed-Fri only     Orders:  -     PTH, Intact; Future; Expected date: 01/12/2024    5. Non-nephrotic range proteinuria  Assessment & Plan:  300 mg  - not on ACE/ARB - wants to wait on adding a medication since protein is stable     Orders:  -     Protein/Creatinine Ratio, Urine; Future; Expected date: 01/12/2024    6. Hyperlipidemia, mixed  Assessment & Plan:  Continue  Lipitor 80 mg po Q HS       7. Vitamin D deficiency, unspecified  Assessment & Plan:  Vitamin D 16.9 - down from 26.8  Increase Vitamin D 3 4000 units po daily     Orders:  -     Vitamin D; Future; Expected date: 01/12/2024    8. Hyponatremia  Assessment & Plan:  Sodium 134 - (mild hyponatremia) assure she is not over indulging in her water intake.  Restrict to 64 - 72 oz daily.     Orders:  -     Renal Function Panel; Future; Expected date: 01/12/2024    Other orders  -     calcitRIOL (ROCALTROL) 0.25 MCG Cap; Take 1 capsule (0.25 mcg total) by mouth every Mon, Wed, Fri. Mon, wed, fri  Dispense: 30 capsule; Refill: 11           My reconciliation of medication should not condone or support use of medications that have been previously prescribed for patients by other providers.  I am only documenting the current medications patients is taking and may change doses, add or discontinue medications based on information provided by the patient.     The patient has been provided with their current level of kidney function including eGFR and creatinine.    We discussed the potential for common complications of CKD including anemia, electrolyte abnormalities, abnormal fluid balance, mineral bone disease and malnutrition.    We discussed strategies to slow the progression of their kidney disease including:  Avoid nephrotoxic agents. Avoid over-the-counter and prescription NSAIDs (Ibuprofren, Motrin, Naproxyn, Aleve, Mobic, Celebrex, Toradol, Advil). All of these can worsen kidney function, elevate BP, cause fluid retention/swelling and elevate potassium. Avoid iodine contrast agents and gadolinium, which can worsen kidney function and/or cause kidney failure. Avoid phosphosoda bowel preps which can worsen kidney function.  Work to improve modifiable risk factors. Aim for good control of blood glucose without episodes of hypoglycemia. Notify the provider managing your diabetes if your blood glucose < 60. Aim for good blood  pressure control without episodes of hypotension. Call the office if your systolic blood pressure is consistently < 110. Aim for good control of your cholesterol.  AIC goal <7.0  BP goal <130/80        Keeping these in goal range will help prevent progression of cardiovascular disease            and chronic kidney disease.    We discussed dietary modifications:  Low sodium diet: 2 gm/d or less  Limit/avoid high potassium foods  Avoid potassium containing salt substitutes  Limit/avoid high phosphorus foods  Limit daily protein intake to 0.8-1 gm/kg of your ideal body weight.    We discussed lifestyle modifications:  Make sure you are drinking plenty of fluids--64 ounces (1/2 gallon) daily  Exercise at least 30 minutes 5 x per week (total 150 minutes per week), example brisk walking  Achieve and maintain a healthy weight (BMI 20-25)  Limit alcohol consumption to <2 drinks per day  Stop smoking  Make sure you stay current on important vaccines-- pneumonia vaccines (Pneumovax and Prevnar), flu vaccine, Hepatitis B (especially patients nearing renal replacement therapy and planning hemodialysis) and Covid-19 vaccine.     Recommendations:  Monitor your BP at home daily and record.  Bring readings to your next appt.  Call the office if your BP is persistently >130/80.  Seek urgent medical attention with signs and symptoms of uremia - extreme weakness, fatigue, confusion, anorexia, metallic taste in mouth, hiccoughs, cramping, itching, chest pain, swelling, or trouble sleeping.    Follow Up:   Follow up in about 4 months (around 1/15/2024).